# Patient Record
Sex: MALE | Race: WHITE | NOT HISPANIC OR LATINO | Employment: FULL TIME | URBAN - NONMETROPOLITAN AREA
[De-identification: names, ages, dates, MRNs, and addresses within clinical notes are randomized per-mention and may not be internally consistent; named-entity substitution may affect disease eponyms.]

---

## 2024-04-29 ENCOUNTER — OFFICE VISIT (OUTPATIENT)
Dept: URGENT CARE | Facility: MEDICAL CENTER | Age: 34
End: 2024-04-29
Payer: COMMERCIAL

## 2024-04-29 VITALS — OXYGEN SATURATION: 97 % | HEART RATE: 63 BPM | RESPIRATION RATE: 22 BRPM | WEIGHT: 209 LBS | TEMPERATURE: 97.4 F

## 2024-04-29 DIAGNOSIS — M23.91 INTERNAL DERANGEMENT OF RIGHT KNEE: Primary | ICD-10-CM

## 2024-04-29 PROCEDURE — 99213 OFFICE O/P EST LOW 20 MIN: CPT | Performed by: STUDENT IN AN ORGANIZED HEALTH CARE EDUCATION/TRAINING PROGRAM

## 2024-04-29 NOTE — PROGRESS NOTES
[facility]        NAME: Claudio Gill is a 33 y.o. male  : 1990    MRN: [id number]    Assessment and Plan   Internal derangement of right knee [M23.91]  1. Internal derangement of right knee  Ambulatory Referral to Orthopedic Surgery        Patient presenting to the office today few days after a injury to the right knee secondary to varus force with intermittent symptoms.  No signs of knee instability at this time.  Concern for possible LCL sprain, low suspicion for tear as exam was largely unremarkable without any signs of effusion.  Provided orthopedic surgery referral for patient-he is from the [place], [place] area so he will schedule this appointment on his own.  Recommend RICE and NSAIDs for management.    Patient Instructions     See wrap up for details  Follow up with PCP in 3-5 days.  Proceed to  ER if symptoms worsen.    Chief Complaint     Chief Complaint   Patient presents with    Knee Pain     Right Knee gave out and heard a pop. Feels like the inside of knee is drifting. Happened on Thursday. Was resting it with elevation and ice. If he make wrong movement. Has a sharp pain. Taking Advil for pain           History of Present Illness     HPI  Patient presents with injury to the right knee that occurred 5 days ago.  Was at [employer] practice with his -was on his back on the floor, had right knee in slightly bent position and while pushing off his foot, the knee went outwards, thinks his  may have accidentally slipped or stepped on the knee from the inside portion providing a varus force.  Did not have any significant pain right away but did in the morning.  Has been managing with cold compress along with ibuprofen and Tylenol with good relief.  He reports his symptoms are intermittent and have not been constant.  Sometimes, when he is standing up, he feels like his knee is moving inwards and about to give out but denies any loss of balance.  Sometimes also reports pain when he  is turning his knee internally.  No prior knee injury     Review of Systems   Review of Systems   Constitutional:  Negative for chills and fever.   HENT:  Negative for ear pain and sore throat.    Eyes:  Negative for pain and visual disturbance.   Respiratory:  Negative for cough and shortness of breath.    Cardiovascular:  Negative for chest pain and palpitations.   Gastrointestinal:  Negative for abdominal pain and vomiting.   Genitourinary:  Negative for dysuria and hematuria.   Musculoskeletal:  Positive for arthralgias. Negative for back pain.   Skin:  Negative for color change and rash.   Neurological:  Negative for seizures and syncope.   All other systems reviewed and are negative.    Current Medications     No current outpatient medications on file.    Current Allergies     Allergies as of 04/29/2024    (No Known Allergies)            The following portions of the patient's history were reviewed and updated as appropriate: allergies, current medications, past family history, past medical history, past social history, past surgical history and problem list.     Past Medical History:   Diagnosis Date    Concussion        History reviewed. No pertinent surgical history.    History reviewed. No pertinent family history.      Medications have been verified.        Objective   Pulse 63   Temp (!) 97.4 °F (36.3 °C)   Resp 22   Wt 94.8 kg (209 lb)   SpO2 97%        Physical Exam     Physical Exam  Constitutional:       General: He is not in acute distress.     Appearance: He is normal weight. He is not toxic-appearing.   HENT:      Head: Normocephalic and atraumatic.      Right Ear: External ear normal.      Left Ear: External ear normal.      Nose: Nose normal.   Cardiovascular:      Rate and Rhythm: Normal rate.   Pulmonary:      Effort: Pulmonary effort is normal. No respiratory distress.   Musculoskeletal:      Cervical back: Normal range of motion.      Right knee: No swelling, deformity, effusion,  erythema, ecchymosis, lacerations, bony tenderness or crepitus. Normal range of motion. Tenderness present over the MCL and LCL. No medial joint line or lateral joint line tenderness. No LCL laxity, MCL laxity, ACL laxity or PCL laxity. Normal alignment, normal meniscus and normal patellar mobility. Normal pulse.      Instability Tests: Anterior drawer test negative. Posterior drawer test negative. Anterior Lachman test negative. Medial Trudy test negative and lateral Trudy test negative.   Neurological:      Mental Status: He is alert.

## 2024-07-25 ENCOUNTER — OFFICE VISIT (OUTPATIENT)
Age: 34
End: 2024-07-25
Payer: COMMERCIAL

## 2024-07-25 VITALS
HEART RATE: 60 BPM | WEIGHT: 190 LBS | SYSTOLIC BLOOD PRESSURE: 112 MMHG | OXYGEN SATURATION: 97 % | TEMPERATURE: 98.3 F | RESPIRATION RATE: 16 BRPM | HEIGHT: 72 IN | DIASTOLIC BLOOD PRESSURE: 80 MMHG | BODY MASS INDEX: 25.73 KG/M2

## 2024-07-25 DIAGNOSIS — M25.561 ACUTE PAIN OF RIGHT KNEE: Primary | ICD-10-CM

## 2024-07-25 PROCEDURE — 99213 OFFICE O/P EST LOW 20 MIN: CPT | Performed by: EMERGENCY MEDICINE

## 2024-07-25 RX ORDER — PREDNISONE 10 MG/1
TABLET ORAL
Qty: 27 TABLET | Refills: 0 | Status: SHIPPED | OUTPATIENT
Start: 2024-07-25

## 2024-07-25 NOTE — PATIENT INSTRUCTIONS
Patient Education     Sneed's Cyst Discharge Instructions   About this topic   Your knee joint has fluid in it. This helps the joint move and glide. Sometimes, if there is a problem in the knee, fluid can build up behind the knee. A Baker's cyst is a fluid-filled sac in the back of the knee. It may cause you some discomfort or you may not feel any pain at all. A Baker's cyst most often happens because of some other problem or injury in your knee. A Baker's cyst may go away on its own.       What care is needed at home?   Ask your doctor what you need to do when you go home. Make sure you ask questions if you do not understand what the doctor says. This way you will know what you need to do.  If you are not having many problems because of the cyst, your doctor may not treat it. If it is causing you problems, your doctor may suggest:  Rest. Allow your injury to heal before you do slow movements.  Place an ice pack or a bag of frozen peas wrapped in a towel over the painful part. Never put ice right on the skin. Do not leave the ice on more than 10 to 15 minutes at a time.  Prop your leg on pillows to help with swelling.  Compression ? An ACE wrap can be wrapped lightly around the injured area for support and to ease swelling.  Brace or neoprene sleeve for support and swelling  Using crutches, a walker, or a cane to take pressure off the knee  Exercises  What follow-up care is needed?   Your doctor may ask you to make visits to the office to check on your progress. Be sure to keep these visits. Your doctor may send you to physical therapy to help you heal faster.  What drugs may be needed?   The doctor may order drugs to:  Help with pain and swelling  Fight an infection  The doctor may give you a shot of an anti-inflammatory drug called a corticosteroid. This will help with swelling. Talk with your doctor about the risks of this shot.  Will physical activity be limited?   You may need to rest your injured knee for a  while. You should not do physical activity that makes your health problem worse. Talk to your doctor if you run, work out, or play sports. You may not be able to do those things until your health problem gets better.  When do I need to call the doctor?   Signs of a very bad reaction. These include wheezing; chest tightness; fever; itching; bad cough; blue skin color; seizures; or swelling of face, lips, tongue, or throat. Go to the ER right away.  Signs of infection. These include a fever of 100.4°F (38°C) or higher, chills, very bad sore throat, ear or sinus pain, cough, more sputum or change in color of sputum, pain with passing urine, mouth sores, or wound that will not heal.  Pain or swelling gets worse  You are not feeling better in 2 to 3 days or you are feeling worse  Teach Back: Helping You Understand   Teach Back Method helps you understand the information we are giving you. After you talk with the staff, tell them in your own words what you learned. This helps to make sure the staff has described each thing clearly. It also helps to explain things that may have been confusing. Before going home, make sure you can do these:  I can tell you about my condition.  I can tell you what may help ease my pain.  I can tell you what I will do if I have more pain or swelling.  Last Reviewed Date   2020-08-25  Consumer Information Use and Disclaimer   This generalized information is a limited summary of diagnosis, treatment, and/or medication information. It is not meant to be comprehensive and should be used as a tool to help the user understand and/or assess potential diagnostic and treatment options. It does NOT include all information about conditions, treatments, medications, side effects, or risks that may apply to a specific patient. It is not intended to be medical advice or a substitute for the medical advice, diagnosis, or treatment of a health care provider based on the health care provider's examination and  assessment of a patient’s specific and unique circumstances. Patients must speak with a health care provider for complete information about their health, medical questions, and treatment options, including any risks or benefits regarding use of medications. This information does not endorse any treatments or medications as safe, effective, or approved for treating a specific patient. [employer] and its affiliates disclaim any warranty or liability relating to this information or the use thereof. The use of this information is governed by the Terms of Use, available at https://www.[employer].com/en/know/clinical-effectiveness-terms   Copyright   Copyright © 2024 [employer] and its affiliates and/or licensors. All rights reserved.

## 2024-07-25 NOTE — PROGRESS NOTES
[facility]        NAME: Claudio Gill is a 34 y.o. male  : 1990    MRN: [id number]  DATE: 2024  TIME: 6:39 PM    Assessment and Plan   Acute pain of right knee [M25.561]  1. Acute pain of right knee              Patient Instructions     Patient Instructions   Patient Education     Sneed's Cyst Discharge Instructions   About this topic   Your knee joint has fluid in it. This helps the joint move and glide. Sometimes, if there is a problem in the knee, fluid can build up behind the knee. A Baker's cyst is a fluid-filled sac in the back of the knee. It may cause you some discomfort or you may not feel any pain at all. A Baker's cyst most often happens because of some other problem or injury in your knee. A Baker's cyst may go away on its own.       What care is needed at home?   Ask your doctor what you need to do when you go home. Make sure you ask questions if you do not understand what the doctor says. This way you will know what you need to do.  If you are not having many problems because of the cyst, your doctor may not treat it. If it is causing you problems, your doctor may suggest:  Rest. Allow your injury to heal before you do slow movements.  Place an ice pack or a bag of frozen peas wrapped in a towel over the painful part. Never put ice right on the skin. Do not leave the ice on more than 10 to 15 minutes at a time.  Prop your leg on pillows to help with swelling.  Compression ? An ACE wrap can be wrapped lightly around the injured area for support and to ease swelling.  Brace or neoprene sleeve for support and swelling  Using crutches, a walker, or a cane to take pressure off the knee  Exercises  What follow-up care is needed?   Your doctor may ask you to make visits to the office to check on your progress. Be sure to keep these visits. Your doctor may send you to physical therapy to help you heal faster.  What drugs may be needed?   The doctor may order drugs to:  Help with  pain and swelling  Fight an infection  The doctor may give you a shot of an anti-inflammatory drug called a corticosteroid. This will help with swelling. Talk with your doctor about the risks of this shot.  Will physical activity be limited?   You may need to rest your injured knee for a while. You should not do physical activity that makes your health problem worse. Talk to your doctor if you run, work out, or play sports. You may not be able to do those things until your health problem gets better.  When do I need to call the doctor?   Signs of a very bad reaction. These include wheezing; chest tightness; fever; itching; bad cough; blue skin color; seizures; or swelling of face, lips, tongue, or throat. Go to the ER right away.  Signs of infection. These include a fever of 100.4°F (38°C) or higher, chills, very bad sore throat, ear or sinus pain, cough, more sputum or change in color of sputum, pain with passing urine, mouth sores, or wound that will not heal.  Pain or swelling gets worse  You are not feeling better in 2 to 3 days or you are feeling worse  Teach Back: Helping You Understand   Teach Back Method helps you understand the information we are giving you. After you talk with the staff, tell them in your own words what you learned. This helps to make sure the staff has described each thing clearly. It also helps to explain things that may have been confusing. Before going home, make sure you can do these:  I can tell you about my condition.  I can tell you what may help ease my pain.  I can tell you what I will do if I have more pain or swelling.  Last Reviewed Date   2020-08-25  Consumer Information Use and Disclaimer   This generalized information is a limited summary of diagnosis, treatment, and/or medication information. It is not meant to be comprehensive and should be used as a tool to help the user understand and/or assess potential diagnostic and treatment options. It does NOT include all  information about conditions, treatments, medications, side effects, or risks that may apply to a specific patient. It is not intended to be medical advice or a substitute for the medical advice, diagnosis, or treatment of a health care provider based on the health care provider's examination and assessment of a patient’s specific and unique circumstances. Patients must speak with a health care provider for complete information about their health, medical questions, and treatment options, including any risks or benefits regarding use of medications. This information does not endorse any treatments or medications as safe, effective, or approved for treating a specific patient. Up[employer], [employer] and its affiliates disclaim any warranty or liability relating to this information or the use thereof. The use of this information is governed by the Terms of Use, available at https://www.[employer].[facility]/en/know/clinical-effectiveness-terms   Copyright   Copyright © 2024 [employer], [employer] and its affiliates and/or licensors. All rights reserved.      Follow up with PCP in 3-5 days.  Proceed to  ER if symptoms worsen.    Chief Complaint     Chief Complaint   Patient presents with    Leg Pain     Since 7/5/24, felt something pop and then felt something warm running down inside of leg.  Right leg only., swelling decreased         History of Present Illness       Patient noticed a nontender lump posterior lateral aspect of his right knee several weeks ago.  Patient had a sudden onset of pain in the area of that lump 3 weeks ago while participating in [employer] practice.  Patient notes at the time of the pain he had a sensation of warmth running down the posterior lateral aspect of his right lower leg under the skin.  Patient noted bruising in the area for about 2 weeks following the injury.  Patient notes pain is somewhat improved but still present with certain movements and he notes persistent limitation of range of motion in his  right knee that has only slightly improved as well.  He denies deep calf tenderness.  He denies history of PE or DVT.  He denies any chest pain or shortness of breath.    Leg Pain   Pertinent negatives include no numbness.       Review of Systems   Review of Systems   Constitutional:  Negative for chills and fever.   Musculoskeletal:  Positive for arthralgias and joint swelling. Negative for gait problem and myalgias.   Skin:  Negative for color change, rash and wound.   Neurological:  Negative for numbness and headaches.         Current Medications     No current outpatient medications on file.    Current Allergies     Allergies as of 07/25/2024    (No Known Allergies)            The following portions of the patient's history were reviewed and updated as appropriate: allergies, current medications, past family history, past medical history, past social history, past surgical history and problem list.     Past Medical History:   Diagnosis Date    Concussion        History reviewed. No pertinent surgical history.    History reviewed. No pertinent family history.      Medications have been verified.        Objective   /80   Pulse 60   Temp 98.3 °F (36.8 °C) (Tympanic)   Resp 16   Ht 6' (1.829 m)   Wt 86.2 kg (190 lb)   SpO2 97%   BMI 25.77 kg/m²        Physical Exam     Physical Exam  Vitals and nursing note reviewed.   Constitutional:       General: He is not in acute distress.     Appearance: He is well-developed. He is not ill-appearing or toxic-appearing.   HENT:      Head: Normocephalic and atraumatic.   Cardiovascular:      Rate and Rhythm: Normal rate and regular rhythm.   Pulmonary:      Effort: Pulmonary effort is normal.      Breath sounds: Normal breath sounds.   Musculoskeletal:         General: Tenderness present. No swelling or deformity.      Cervical back: Neck supple.      Comments: Mild tenderness posterior lateral aspect of right knee   Skin:     General: Skin is warm and dry.       Findings: No erythema or rash.   Neurological:      Mental Status: He is alert and oriented to person, place, and time.      Deep Tendon Reflexes: Reflexes are normal and symmetric.   Psychiatric:         Mood and Affect: Mood normal.         Behavior: Behavior normal.         Thought Content: Thought content normal.         Judgment: Judgment normal.

## 2025-02-16 ENCOUNTER — OFFICE VISIT (OUTPATIENT)
Age: 35
End: 2025-02-16
Payer: COMMERCIAL

## 2025-02-16 VITALS
WEIGHT: 194 LBS | HEIGHT: 72 IN | DIASTOLIC BLOOD PRESSURE: 76 MMHG | OXYGEN SATURATION: 97 % | TEMPERATURE: 95.1 F | HEART RATE: 77 BPM | RESPIRATION RATE: 18 BRPM | BODY MASS INDEX: 26.28 KG/M2 | SYSTOLIC BLOOD PRESSURE: 114 MMHG

## 2025-02-16 DIAGNOSIS — M25.511 ACUTE PAIN OF RIGHT SHOULDER: Primary | ICD-10-CM

## 2025-02-16 PROCEDURE — 99213 OFFICE O/P EST LOW 20 MIN: CPT | Performed by: EMERGENCY MEDICINE

## 2025-02-16 RX ORDER — CYCLOBENZAPRINE HCL 10 MG
10 TABLET ORAL 3 TIMES DAILY PRN
Qty: 30 TABLET | Refills: 0 | Status: SHIPPED | OUTPATIENT
Start: 2025-02-16

## 2025-02-16 RX ORDER — PREDNISONE 10 MG/1
TABLET ORAL
Qty: 27 TABLET | Refills: 0 | Status: SHIPPED | OUTPATIENT
Start: 2025-02-16

## 2025-02-16 NOTE — PATIENT INSTRUCTIONS
" May wear a sling for comfort but remove at least every few hours for pendulum exercise as demonstrated.  Gentle stretching as discussed.  Encourage pain free movement of the affected arm but do not attempt any activities that will cause pain in that arm / shoulder.  Use heat, as discussed, 10 minutes every 2 - 3 hours to increase circulation to begin 24 hrs after the injury.  Shoulder pain   The Basics   Written by the doctors and editors at [facility]   What are the parts of the shoulder? -- The shoulder joint is made up of the upper arm bone, collarbone, and shoulder blade. It includes muscles, ligaments, tendons, and bursae (figure 1). All of these parts work together to help the shoulder move comfortably in different directions.  What can cause shoulder pain? -- Shoulder pain can happen when you damage or injure any of the different parts of the shoulder.  Different conditions can cause shoulder pain. They include:   Bursitis - This is a condition that can cause pain or swelling next to a joint. A \"bursa\" is a small fluid-filled sac that sits near a bone. It cushions and protects nearby tissues when they rub on or slide over bones. Bursitis is when a bursa gets irritated and swollen.   Frozen shoulder - This is a condition that causes the shoulder to be stiff, painful, and hard to move. If you have a frozen shoulder, the tissue around the shoulder joint gets thick and tight. This might happen after a shoulder gets injury or surgery.   Rotator cuff injury - The rotator cuff is made up of 4 shoulder muscles and their tendons. Tendons are strong bands of tissue that connect muscles to bones. Rotator cuff injuries cause pain in your shoulder and sometimes in your upper arm.   Shoulder impingement - This happens when a muscle, tendon, or bursa gets squeezed between bones.    shoulder - This happens when certain ligaments tear or get stretched too much. Ligaments are strong bands of tissue that connect bone to " "bone. The most common causes of a  shoulder are falling on the shoulder or getting hit in the shoulder. A  shoulder can be mild or severe, depending on how many ligaments are torn.   Osteoarthritis - This is a common condition that often comes with age. The cartilage in the joints wears down, causing the bones to rub against each other. This can cause pain, stiffness, and swelling in the shoulder joints.  Will I need tests? -- Maybe. Your doctor or nurse will talk with you and do an exam. They might also do an imaging test of your shoulder such as an X-ray, MRI, or ultrasound. Imaging tests create pictures of the inside of the body.  How is shoulder pain treated? -- Many causes of shoulder pain get better on their own. But it can take weeks to months to heal completely.  Your doctor might recommend:   Pain-relieving medicines called \"nonsteroidal anti-inflammatory drugs\" (NSAIDs) - These include ibuprofen (sample brand names: Advil, Motrin) and naproxen (sample brand name: Aleve). They can reduce pain and swelling.   Exercises and stretches - It can help to work with a physical therapist (exercise expert). They can teach you gentle stretches and exercises to help with your symptoms. Follow the physical therapist's advice for how often and when to do the exercises.   Steroid injections - Steroid medicines help reduce inflammation. Doctors can inject steroids into the shoulder to help reduce symptoms.   Surgery - Surgery might be an option if other treatments do not work and you have had symptoms for a long time.  Is there anything I can do on my own to feel better?    Rest your shoulder - Avoid lifting things, reaching overhead or across your chest, or sleeping on the shoulder that hurts. If your doctor gave you a sling to support your arm, follow instructions for using it. Or you might get a bandage that goes around your shoulders and upper back instead.   Take medicine to reduce pain and swelling " - To treat pain, you can take acetaminophen (sample brand name: Tylenol). To treat pain and swelling, you can take ibuprofen (sample brand names: Advil, Motrin).   Prop your shoulder on pillows - Keep it raised above the level of your heart. This can help with pain and swelling.   Ice your shoulder - Put a cold gel pack, bag of ice, or bag of frozen vegetables on the injured area every 1 to 2 hours, for 15 minutes each time. Put a thin towel between the ice (or other cold object) and your skin. Use the ice (or other cold object) for at least 6 hours after your injury. Some people find it helpful to ice longer, even up to 2 days after their injury. Ice can also be helpful after doing shoulder exercises.   Put heat on the area to reduce pain and stiffness - Do not use heat for more than 20 minutes at a time. Also, do not use anything too hot that could burn your skin.   Do pendulum exercises to keep your shoulder from getting too stiff (figure 2):   Let your arm relax and hang down while you sit or stand. Gently move your arm:   Back and forth at your side   Side to side across your body   Around in small circles   Do this exercise for 5 minutes, 1 or 2 times a day.   Start slowly, and make the exercises harder over time. For example, make small circles with your arm at first. Over time, make bigger circles or hold weights in your hand.   Your doctor, nurse, or physical therapist can show you how to do other exercises to strengthen the muscles around your shoulder. They will tell you when to start them and how often to do them.   Before exercising your shoulder, warm up the muscles first. You can do this by taking a hot shower or bath, or using a heating pad. Some soreness is normal. If you have sharp, tearing, or worsening pain, stop what you're doing and let your doctor or nurse know.  When should I call the doctor? -- Call for emergency help right away (in the [place] and [place], call 9-1-1) if:   You have shoulder pain  "and start to have trouble breathing or bad chest discomfort.  Call the doctor or nurse for advice if:   You have very bad pain that is not helped by medicines.   Your hand or arm becomes weak or swollen.   Your fingers are numb, tingly, or blue or gray in color.  All topics are updated as new evidence becomes available and our peer review process is complete.  This topic retrieved from [employer] on: Apr 25, 2024.  Topic 558309 Version 3.0  Release: 32.4.2 - C32.114  © 2024 [email or web address] and/or its affiliates. All rights reserved.  figure 1: Parts of the shoulder     These are the different parts of the shoulder as viewed from the front (A) and the back (B). The shoulder joint is made up of the upper arm bone, collarbone, and shoulder blade. Ligaments, muscles, and tendons help hold the joint in place and allow you to move your arm. A \"bursa\" is a small fluid-filled sac that sits near a bone. It cushions and protects nearby tissues when they rub on or slide over bones.  Graphic 074750 Version 1.0  figure 2: Pendulum exercises     Letyour arm relax and hang down while you sit or stand. Gently move your arm backand forth at your side, then side to side across your body, and then around insmall circles. Do this exercise for 5 minutes, 1 or 2 times a day. Youcan make this exercise harder by making bigger movements or holding a smallweight in your hand.  Graphic 803135 Version 1.0  Consumer Information Use and Disclaimer   Disclaimer: This generalized information is a limited summary of diagnosis, treatment, and/or medication information. It is not meant to be comprehensive and should be used as a tool to help the user understand and/or assess potential diagnostic and treatment options. It does NOT include all information about conditions, treatments, medications, side effects, or risks that may apply to a specific patient. It is not intended to be medical advice or a substitute for the medical advice, diagnosis, or " treatment of a health care provider based on the health care provider's examination and assessment of a patient's specific and unique circumstances. Patients must speak with a health care provider for complete information about their health, medical questions, and treatment options, including any risks or benefits regarding use of medications. This information does not endorse any treatments or medications as safe, effective, or approved for treating a specific patient. UpToDate, Inc. and its affiliates disclaim any warranty or liability relating to this information or the use thereof.The use of this information is governed by the Terms of Use, available at https://www.[employer].com/en/know/clinical-effectiveness-terms. 2024© UpToDate, Inc. and its affiliates and/or licensors. All rights reserved.  Copyright   © 2024 Up[employer]Date, Inc. and/or its affiliates. All rights reserved.

## 2025-02-16 NOTE — PROGRESS NOTES
"[facility]        NAME: Claudio Gill is a 34 y.o. male  : 1990    MRN: [id number]  DATE: 2025  TIME: 3:27 PM    Assessment and Plan   Acute pain of right shoulder [M25.511]  1. Acute pain of right shoulder  predniSONE 10 mg tablet    cyclobenzaprine (FLEXERIL) 10 mg tablet            Patient Instructions     Patient Instructions    May wear a sling for comfort but remove at least every few hours for pendulum exercise as demonstrated.  Gentle stretching as discussed.  Encourage pain free movement of the affected arm but do not attempt any activities that will cause pain in that arm / shoulder.  Use heat, as discussed, 10 minutes every 2 - 3 hours to increase circulation to begin 24 hrs after the injury.  Shoulder pain   The Basics   Written by the doctors and editors at [facility]   What are the parts of the shoulder? -- The shoulder joint is made up of the upper arm bone, collarbone, and shoulder blade. It includes muscles, ligaments, tendons, and bursae (figure 1). All of these parts work together to help the shoulder move comfortably in different directions.  What can cause shoulder pain? -- Shoulder pain can happen when you damage or injure any of the different parts of the shoulder.  Different conditions can cause shoulder pain. They include:   Bursitis - This is a condition that can cause pain or swelling next to a joint. A \"bursa\" is a small fluid-filled sac that sits near a bone. It cushions and protects nearby tissues when they rub on or slide over bones. Bursitis is when a bursa gets irritated and swollen.   Frozen shoulder - This is a condition that causes the shoulder to be stiff, painful, and hard to move. If you have a frozen shoulder, the tissue around the shoulder joint gets thick and tight. This might happen after a shoulder gets injury or surgery.   Rotator cuff injury - The rotator cuff is made up of 4 shoulder muscles and their tendons. Tendons are strong bands of " "tissue that connect muscles to bones. Rotator cuff injuries cause pain in your shoulder and sometimes in your upper arm.   Shoulder impingement - This happens when a muscle, tendon, or bursa gets squeezed between bones.    shoulder - This happens when certain ligaments tear or get stretched too much. Ligaments are strong bands of tissue that connect bone to bone. The most common causes of a  shoulder are falling on the shoulder or getting hit in the shoulder. A  shoulder can be mild or severe, depending on how many ligaments are torn.   Osteoarthritis - This is a common condition that often comes with age. The cartilage in the joints wears down, causing the bones to rub against each other. This can cause pain, stiffness, and swelling in the shoulder joints.  Will I need tests? -- Maybe. Your doctor or nurse will talk with you and do an exam. They might also do an imaging test of your shoulder such as an X-ray, MRI, or ultrasound. Imaging tests create pictures of the inside of the body.  How is shoulder pain treated? -- Many causes of shoulder pain get better on their own. But it can take weeks to months to heal completely.  Your doctor might recommend:   Pain-relieving medicines called \"nonsteroidal anti-inflammatory drugs\" (NSAIDs) - These include ibuprofen (sample brand names: Advil, Motrin) and naproxen (sample brand name: Aleve). They can reduce pain and swelling.   Exercises and stretches - It can help to work with a physical therapist (exercise expert). They can teach you gentle stretches and exercises to help with your symptoms. Follow the physical therapist's advice for how often and when to do the exercises.   Steroid injections - Steroid medicines help reduce inflammation. Doctors can inject steroids into the shoulder to help reduce symptoms.   Surgery - Surgery might be an option if other treatments do not work and you have had symptoms for a long time.  Is there anything I can " do on my own to feel better?    Rest your shoulder - Avoid lifting things, reaching overhead or across your chest, or sleeping on the shoulder that hurts. If your doctor gave you a sling to support your arm, follow instructions for using it. Or you might get a bandage that goes around your shoulders and upper back instead.   Take medicine to reduce pain and swelling - To treat pain, you can take acetaminophen (sample brand name: Tylenol). To treat pain and swelling, you can take ibuprofen (sample brand names: Advil, Motrin).   Prop your shoulder on pillows - Keep it raised above the level of your heart. This can help with pain and swelling.   Ice your shoulder - Put a cold gel pack, bag of ice, or bag of frozen vegetables on the injured area every 1 to 2 hours, for 15 minutes each time. Put a thin towel between the ice (or other cold object) and your skin. Use the ice (or other cold object) for at least 6 hours after your injury. Some people find it helpful to ice longer, even up to 2 days after their injury. Ice can also be helpful after doing shoulder exercises.   Put heat on the area to reduce pain and stiffness - Do not use heat for more than 20 minutes at a time. Also, do not use anything too hot that could burn your skin.   Do pendulum exercises to keep your shoulder from getting too stiff (figure 2):   Let your arm relax and hang down while you sit or stand. Gently move your arm:   Back and forth at your side   Side to side across your body   Around in small circles   Do this exercise for 5 minutes, 1 or 2 times a day.   Start slowly, and make the exercises harder over time. For example, make small circles with your arm at first. Over time, make bigger circles or hold weights in your hand.   Your doctor, nurse, or physical therapist can show you how to do other exercises to strengthen the muscles around your shoulder. They will tell you when to start them and how often to do them.   Before exercising your  "shoulder, warm up the muscles first. You can do this by taking a hot shower or bath, or using a heating pad. Some soreness is normal. If you have sharp, tearing, or worsening pain, stop what you're doing and let your doctor or nurse know.  When should I call the doctor? -- Call for emergency help right away (in the [place] and [place], call 9-1-1) if:   You have shoulder pain and start to have trouble breathing or bad chest discomfort.  Call the doctor or nurse for advice if:   You have very bad pain that is not helped by medicines.   Your hand or arm becomes weak or swollen.   Your fingers are numb, tingly, or blue or gray in color.  All topics are updated as new evidence becomes available and our peer review process is complete.  This topic retrieved from [employer] on: Apr 25, 2024.  Topic 352094 Version 3.0  Release: 32.4.2 - C32.114  © 2024 [employer] and/or its affiliates. All rights reserved.  figure 1: Parts of the shoulder     These are the different parts of the shoulder as viewed from the front (A) and the back (B). The shoulder joint is made up of the upper arm bone, collarbone, and shoulder blade. Ligaments, muscles, and tendons help hold the joint in place and allow you to move your arm. A \"bursa\" is a small fluid-filled sac that sits near a bone. It cushions and protects nearby tissues when they rub on or slide over bones.  Graphic 970503 Version 1.0  figure 2: Pendulum exercises     Letyour arm relax and hang down while you sit or stand. Gently move your arm backand forth at your side, then side to side across your body, and then around insmall circles. Do this exercise for 5 minutes, 1 or 2 times a day. Youcan make this exercise harder by making bigger movements or holding a smallweight in your hand.  Graphic 638580 Version 1.0  Consumer Information Use and Disclaimer   Disclaimer: This generalized information is a limited summary of diagnosis, treatment, and/or medication information. It is not meant " to be comprehensive and should be used as a tool to help the user understand and/or assess potential diagnostic and treatment options. It does NOT include all information about conditions, treatments, medications, side effects, or risks that may apply to a specific patient. It is not intended to be medical advice or a substitute for the medical advice, diagnosis, or treatment of a health care provider based on the health care provider's examination and assessment of a patient's specific and unique circumstances. Patients must speak with a health care provider for complete information about their health, medical questions, and treatment options, including any risks or benefits regarding use of medications. This information does not endorse any treatments or medications as safe, effective, or approved for treating a specific patient. UpToDate, Inc. and its affiliates disclaim any warranty or liability relating to this information or the use thereof.The use of this information is governed by the Terms of Use, available at https://www.[employer].com/en/know/clinical-effectiveness-terms. 2024© Up[employer]Date, Inc. and its affiliates and/or licensors. All rights reserved.  Copyright   © 2024 [employer], Inc. and/or its affiliates. All rights reserved.      Follow up with PCP in 3-5 days.  Proceed to  ER if symptoms worsen.    Chief Complaint     Chief Complaint   Patient presents with    Back Pain     Pt states he was practicing [employer] on Tuesday and felt a pulling feeling in his back. Has had pain since. Has used warm and cold compresses and got a massage but is still experiencing pain. Pt states the pain radiates from the right side of his scapula to his oblique on the right side.          History of Present Illness       Patient complains of pain right shoulder for the past 5 days, onset after [employer] training.  States pain is under and just above right scapula.  Partial relief with massage therapy        Review of  Systems   Review of Systems   Constitutional:  Negative for chills and fever.   Respiratory: Negative.     Cardiovascular: Negative.    Musculoskeletal:  Positive for arthralgias. Negative for back pain.   Skin:  Negative for rash.   Neurological:  Negative for weakness and numbness.         Current Medications       Current Outpatient Medications:     cyclobenzaprine (FLEXERIL) 10 mg tablet, Take 1 tablet (10 mg total) by mouth 3 (three) times a day as needed for muscle spasms, Disp: 30 tablet, Rfl: 0    predniSONE 10 mg tablet, Take once daily all days pills on this schedule 6- 6- 5- 4- 3- 2- 1, Disp: 27 tablet, Rfl: 0    predniSONE 10 mg tablet, Take once daily all days pills on this schedule 6- 6- 5- 4- 3- 2- 1 (Patient not taking: Reported on 2/16/2025), Disp: 27 tablet, Rfl: 0    Current Allergies     Allergies as of 02/16/2025    (No Known Allergies)            The following portions of the patient's history were reviewed and updated as appropriate: allergies, current medications, past family history, past medical history, past social history, past surgical history and problem list.     Past Medical History:   Diagnosis Date    Concussion        History reviewed. No pertinent surgical history.    No family history on file.      Medications have been verified.        Objective   /76   Pulse 77   Temp (!) 95.1 °F (35.1 °C)   Resp 18   Ht 6' (1.829 m)   Wt 88 kg (194 lb)   SpO2 97%   BMI 26.31 kg/m²        Physical Exam     Physical Exam  Vitals and nursing note reviewed.   Constitutional:       General: He is not in acute distress.     Appearance: He is well-developed. He is not ill-appearing or toxic-appearing.   HENT:      Head: Normocephalic and atraumatic.   Cardiovascular:      Rate and Rhythm: Normal rate and regular rhythm.   Pulmonary:      Effort: Pulmonary effort is normal.      Breath sounds: Normal breath sounds.   Musculoskeletal:         General: Tenderness present. No swelling or  deformity.      Cervical back: Neck supple.      Comments: Tender at supraspinatus muscle on the right, tender at subscapularis muscle on the right, right shoulder ranges of motion full but painful.   Skin:     General: Skin is warm and dry.      Findings: No erythema or rash.   Neurological:      Mental Status: He is alert and oriented to person, place, and time.      Deep Tendon Reflexes: Reflexes are normal and symmetric.   Psychiatric:         Mood and Affect: Mood normal.         Behavior: Behavior normal.         Thought Content: Thought content normal.         Judgment: Judgment normal.